# Patient Record
Sex: FEMALE | Race: WHITE | URBAN - METROPOLITAN AREA
[De-identification: names, ages, dates, MRNs, and addresses within clinical notes are randomized per-mention and may not be internally consistent; named-entity substitution may affect disease eponyms.]

---

## 2022-04-20 ENCOUNTER — TELEPHONE (OUTPATIENT)
Dept: OBGYN CLINIC | Facility: MEDICAL CENTER | Age: 37
End: 2022-04-20

## 2022-04-20 DIAGNOSIS — N92.6 MISSED PERIOD: Primary | ICD-10-CM

## 2022-04-20 NOTE — TELEPHONE ENCOUNTER
Patient called about a positive pregnancy test lmp 03/21/22  Pt is aware to wait two weeks and labs are put into chart